# Patient Record
Sex: FEMALE | Race: WHITE | Employment: OTHER | URBAN - METROPOLITAN AREA
[De-identification: names, ages, dates, MRNs, and addresses within clinical notes are randomized per-mention and may not be internally consistent; named-entity substitution may affect disease eponyms.]

---

## 2018-06-27 ENCOUNTER — OP HISTORICAL/CONVERTED ENCOUNTER (OUTPATIENT)
Dept: OTHER | Age: 68
End: 2018-06-27

## 2019-03-11 ENCOUNTER — ED HISTORICAL/CONVERTED ENCOUNTER (OUTPATIENT)
Dept: OTHER | Age: 69
End: 2019-03-11

## 2022-06-27 ENCOUNTER — HOSPITAL ENCOUNTER (OUTPATIENT)
Dept: GENERAL RADIOLOGY | Age: 72
Discharge: HOME | End: 2022-06-27
Payer: MEDICARE

## 2022-06-27 ENCOUNTER — TRANSCRIBE ORDER (OUTPATIENT)
Dept: REGISTRATION | Age: 72
End: 2022-06-27

## 2022-06-27 DIAGNOSIS — R06.02 SOB (SHORTNESS OF BREATH): ICD-10-CM

## 2022-06-27 DIAGNOSIS — R06.02 SOB (SHORTNESS OF BREATH): Primary | ICD-10-CM

## 2022-06-27 PROCEDURE — 71046 X-RAY EXAM CHEST 2 VIEWS: CPT

## 2024-06-26 ENCOUNTER — HOSPITAL ENCOUNTER (EMERGENCY)
Facility: HOSPITAL | Age: 74
Discharge: HOME | End: 2024-06-26
Payer: MEDICARE

## 2024-06-26 VITALS
BODY MASS INDEX: 38.98 KG/M2 | WEIGHT: 220 LBS | HEIGHT: 63 IN | SYSTOLIC BLOOD PRESSURE: 116 MMHG | OXYGEN SATURATION: 99 % | HEART RATE: 81 BPM | DIASTOLIC BLOOD PRESSURE: 84 MMHG | RESPIRATION RATE: 16 BRPM | TEMPERATURE: 99 F

## 2024-06-26 DIAGNOSIS — N30.01 ACUTE CYSTITIS WITH HEMATURIA: Primary | ICD-10-CM

## 2024-06-26 LAB
APPEARANCE UR: ABNORMAL
BACTERIA URNS QL MICRO: ABNORMAL /HPF
BILIRUB UR QL: NEGATIVE
COLOR UR: ABNORMAL
EPITH CASTS URNS QL MICRO: ABNORMAL /LPF
GLUCOSE UR STRIP.AUTO-MCNC: NEGATIVE MG/DL
HGB UR QL STRIP: NEGATIVE
KETONES UR QL STRIP.AUTO: NEGATIVE MG/DL
LEUKOCYTE ESTERASE UR QL STRIP.AUTO: ABNORMAL
MUCOUS THREADS URNS QL MICRO: ABNORMAL /LPF
NITRITE UR QL STRIP.AUTO: NEGATIVE
PH UR STRIP: 5 (ref 5–8)
PROT UR STRIP-MCNC: 100 MG/DL
RBC #/AREA URNS HPF: ABNORMAL /HPF (ref 0–5)
SP GR UR REFRACTOMETRY: 1.03 (ref 1–1.03)
URINE CULTURE IF INDICATED: ABNORMAL
UROBILINOGEN UR QL STRIP.AUTO: 4 EU/DL (ref 0.1–1)
WBC URNS QL MICRO: >100 /HPF (ref 0–4)

## 2024-06-26 PROCEDURE — 87086 URINE CULTURE/COLONY COUNT: CPT

## 2024-06-26 PROCEDURE — 81001 URINALYSIS AUTO W/SCOPE: CPT

## 2024-06-26 PROCEDURE — 99283 EMERGENCY DEPT VISIT LOW MDM: CPT

## 2024-06-26 RX ORDER — CEFDINIR 300 MG/1
300 CAPSULE ORAL 2 TIMES DAILY
Qty: 14 CAPSULE | Refills: 0 | Status: SHIPPED | OUTPATIENT
Start: 2024-06-26 | End: 2024-07-03

## 2024-06-26 NOTE — ED PROVIDER NOTES
[facility] EMERGENCY DEPT  EMERGENCY DEPARTMENT HISTORY AND PHYSICAL EXAM      Date: 6/26/2024  Patient Name: Danielle Kitchen  MRN: [id number]  YOB: 1950  Date of evaluation: 6/26/2024  Provider: ESTEFANY Robles NP   Note Started: 2:51 PM EDT 6/26/24    HISTORY OF PRESENT ILLNESS     Chief Complaint   Patient presents with    Chills       History Provided By: Patient    HPI: Danielle Kitchen is a 73 y.o. female with past medical history asBelow presents to the ER for chills.  Patient has had chills for 2 days.  Patient also complains of urinary frequency.  Patient comes in for evaluation.    PAST MEDICAL HISTORY   Past Medical History:  Past Medical History:   Diagnosis Date    Asthma     Hypertension        Past Surgical History:  History reviewed. No pertinent surgical history.    Family History:  History reviewed. No pertinent family history.    Social History:       Allergies:  No Known Allergies    PCP: Luke Owens MD    Current Meds:   No current facility-administered medications for this encounter.     Current Outpatient Medications   Medication Sig Dispense Refill    cefdinir (OMNICEF) 300 MG capsule Take 1 capsule by mouth 2 times daily for 7 days 14 capsule 0       Social Determinants of Health:   Social Determinants of Health     Tobacco Use: Not on file   Alcohol Use: Not on file   Financial Resource Strain: Not on file   Food Insecurity: Not on file   Transportation Needs: Not on file   Physical Activity: Not on file   Stress: Not on file   Social Connections: Not on file   Intimate Partner Violence: Not on file   Depression: Not on file   Housing Stability: Not on file   Interpersonal Safety: Not At Risk (6/26/2024)    Interpersonal Safety Domain Source: IP Abuse Screening     Physical abuse: Denies     Verbal abuse: Denies     Emotional abuse: Denies     Financial abuse: Denies     Sexual abuse: Denies   Utilities: Not on file       PHYSICAL EXAM   Physical Exam  Constitutional:

## 2024-06-26 NOTE — ED TRIAGE NOTES
Pt complains of chills that have been on and off for 2 days. States she hasn't taken her temperature at home. No fever upon arrival. Denies any physical complaints.

## 2024-06-27 LAB
BACTERIA SPEC CULT: NORMAL
COLONY COUNT, CNT: NORMAL
Lab: NORMAL

## 2024-08-16 ENCOUNTER — HOSPITAL ENCOUNTER (EMERGENCY)
Facility: HOSPITAL | Age: 74
Discharge: ANOTHER HOSPITAL | End: 2024-08-16
Attending: EMERGENCY MEDICINE
Payer: MEDICARE

## 2024-08-16 ENCOUNTER — APPOINTMENT (OUTPATIENT)
Facility: HOSPITAL | Age: 74
End: 2024-08-16
Payer: MEDICARE

## 2024-08-16 VITALS
OXYGEN SATURATION: 98 % | HEIGHT: 63 IN | WEIGHT: 225 LBS | DIASTOLIC BLOOD PRESSURE: 89 MMHG | HEART RATE: 80 BPM | RESPIRATION RATE: 18 BRPM | SYSTOLIC BLOOD PRESSURE: 127 MMHG | TEMPERATURE: 98.5 F | BODY MASS INDEX: 39.87 KG/M2

## 2024-08-16 DIAGNOSIS — N39.0 URINARY TRACT INFECTION WITHOUT HEMATURIA, SITE UNSPECIFIED: ICD-10-CM

## 2024-08-16 DIAGNOSIS — A41.9 SEPTICEMIA (HCC): ICD-10-CM

## 2024-08-16 DIAGNOSIS — N20.0 KIDNEY STONE: Primary | ICD-10-CM

## 2024-08-16 LAB
ALBUMIN SERPL-MCNC: 2.9 G/DL (ref 3.5–5)
ALBUMIN/GLOB SERPL: 0.9 (ref 1.1–2.2)
ALP SERPL-CCNC: 72 U/L (ref 45–117)
ALT SERPL-CCNC: 9 U/L (ref 12–78)
ANION GAP SERPL CALC-SCNC: 5 MMOL/L (ref 5–15)
APPEARANCE UR: CLEAR
AST SERPL W P-5'-P-CCNC: 7 U/L (ref 15–37)
BACTERIA URNS QL MICRO: NEGATIVE /HPF
BASOPHILS # BLD: 0 K/UL (ref 0–0.1)
BASOPHILS NFR BLD: 0 % (ref 0–1)
BILIRUB SERPL-MCNC: 0.5 MG/DL (ref 0.2–1)
BILIRUB UR QL: NEGATIVE
BUN SERPL-MCNC: 20 MG/DL (ref 6–20)
BUN/CREAT SERPL: 17 (ref 12–20)
CA-I BLD-MCNC: 8.7 MG/DL (ref 8.5–10.1)
CHLORIDE SERPL-SCNC: 102 MMOL/L (ref 97–108)
CO2 SERPL-SCNC: 29 MMOL/L (ref 21–32)
COLOR UR: ABNORMAL
CREAT SERPL-MCNC: 1.15 MG/DL (ref 0.55–1.02)
DIFFERENTIAL METHOD BLD: ABNORMAL
EOSINOPHIL # BLD: 0.1 K/UL (ref 0–0.4)
EOSINOPHIL NFR BLD: 1 % (ref 0–7)
EPITH CASTS URNS QL MICRO: ABNORMAL /LPF
ERYTHROCYTE [DISTWIDTH] IN BLOOD BY AUTOMATED COUNT: 15.7 % (ref 11.5–14.5)
GLOBULIN SER CALC-MCNC: 3.3 G/DL (ref 2–4)
GLUCOSE BLD STRIP.AUTO-MCNC: 117 MG/DL (ref 65–100)
GLUCOSE SERPL-MCNC: 122 MG/DL (ref 65–100)
GLUCOSE UR STRIP.AUTO-MCNC: NEGATIVE MG/DL
HCT VFR BLD AUTO: 30.8 % (ref 35–47)
HGB BLD-MCNC: 9.7 G/DL (ref 11.5–16)
HGB UR QL STRIP: NEGATIVE
IMM GRANULOCYTES # BLD AUTO: 0.1 K/UL (ref 0–0.04)
IMM GRANULOCYTES NFR BLD AUTO: 1 % (ref 0–0.5)
KETONES UR QL STRIP.AUTO: NEGATIVE MG/DL
LACTATE BLD-SCNC: 1.5 MMOL/L (ref 0.4–2)
LEUKOCYTE ESTERASE UR QL STRIP.AUTO: ABNORMAL
LYMPHOCYTES # BLD: 0.6 K/UL (ref 0.8–3.5)
LYMPHOCYTES NFR BLD: 5 % (ref 12–49)
MCH RBC QN AUTO: 25.5 PG (ref 26–34)
MCHC RBC AUTO-ENTMCNC: 31.5 G/DL (ref 30–36.5)
MCV RBC AUTO: 80.8 FL (ref 80–99)
MONOCYTES # BLD: 1.3 K/UL (ref 0–1)
MONOCYTES NFR BLD: 11 % (ref 5–13)
NEUTS SEG # BLD: 9.4 K/UL (ref 1.8–8)
NEUTS SEG NFR BLD: 82 % (ref 32–75)
NITRITE UR QL STRIP.AUTO: NEGATIVE
NRBC # BLD: 0 K/UL (ref 0–0.01)
NRBC BLD-RTO: 0 PER 100 WBC
PERFORMED BY:: ABNORMAL
PH UR STRIP: 5 (ref 5–8)
PLATELET # BLD AUTO: 187 K/UL (ref 150–400)
PMV BLD AUTO: 9.9 FL (ref 8.9–12.9)
POTASSIUM SERPL-SCNC: 3.6 MMOL/L (ref 3.5–5.1)
PROCALCITONIN SERPL-MCNC: 0.1 NG/ML
PROT SERPL-MCNC: 6.2 G/DL (ref 6.4–8.2)
PROT UR STRIP-MCNC: NEGATIVE MG/DL
RBC # BLD AUTO: 3.81 M/UL (ref 3.8–5.2)
RBC #/AREA URNS HPF: ABNORMAL /HPF (ref 0–5)
SODIUM SERPL-SCNC: 136 MMOL/L (ref 136–145)
SP GR UR REFRACTOMETRY: >1.03 (ref 1–1.03)
SPECIMEN SITE: ABNORMAL
TROPONIN I SERPL HS-MCNC: 5 NG/L (ref 0–51)
URINE CULTURE IF INDICATED: ABNORMAL
UROBILINOGEN UR QL STRIP.AUTO: 2 EU/DL (ref 0.1–1)
WBC # BLD AUTO: 11.4 K/UL (ref 3.6–11)
WBC URNS QL MICRO: ABNORMAL /HPF (ref 0–4)

## 2024-08-16 PROCEDURE — 84484 ASSAY OF TROPONIN QUANT: CPT

## 2024-08-16 PROCEDURE — 6360000002 HC RX W HCPCS: Performed by: EMERGENCY MEDICINE

## 2024-08-16 PROCEDURE — 84145 PROCALCITONIN (PCT): CPT

## 2024-08-16 PROCEDURE — 99285 EMERGENCY DEPT VISIT HI MDM: CPT

## 2024-08-16 PROCEDURE — 83605 ASSAY OF LACTIC ACID: CPT

## 2024-08-16 PROCEDURE — 36415 COLL VENOUS BLD VENIPUNCTURE: CPT

## 2024-08-16 PROCEDURE — 85025 COMPLETE CBC W/AUTO DIFF WBC: CPT

## 2024-08-16 PROCEDURE — 81001 URINALYSIS AUTO W/SCOPE: CPT

## 2024-08-16 PROCEDURE — 80053 COMPREHEN METABOLIC PANEL: CPT

## 2024-08-16 PROCEDURE — 2580000003 HC RX 258: Performed by: EMERGENCY MEDICINE

## 2024-08-16 PROCEDURE — 6360000004 HC RX CONTRAST MEDICATION: Performed by: EMERGENCY MEDICINE

## 2024-08-16 PROCEDURE — 80047 BASIC METABLC PNL IONIZED CA: CPT

## 2024-08-16 PROCEDURE — 87040 BLOOD CULTURE FOR BACTERIA: CPT

## 2024-08-16 PROCEDURE — 87086 URINE CULTURE/COLONY COUNT: CPT

## 2024-08-16 PROCEDURE — 96375 TX/PRO/DX INJ NEW DRUG ADDON: CPT

## 2024-08-16 PROCEDURE — 74177 CT ABD & PELVIS W/CONTRAST: CPT

## 2024-08-16 PROCEDURE — 96374 THER/PROPH/DIAG INJ IV PUSH: CPT

## 2024-08-16 PROCEDURE — 6370000000 HC RX 637 (ALT 250 FOR IP): Performed by: EMERGENCY MEDICINE

## 2024-08-16 PROCEDURE — 71045 X-RAY EXAM CHEST 1 VIEW: CPT

## 2024-08-16 PROCEDURE — 93005 ELECTROCARDIOGRAM TRACING: CPT | Performed by: EMERGENCY MEDICINE

## 2024-08-16 RX ORDER — LISINOPRIL AND HYDROCHLOROTHIAZIDE 12.5; 1 MG/1; MG/1
1 TABLET ORAL DAILY
COMMUNITY
Start: 2023-08-20

## 2024-08-16 RX ORDER — MORPHINE SULFATE 2 MG/ML
2 INJECTION, SOLUTION INTRAMUSCULAR; INTRAVENOUS
Status: COMPLETED | OUTPATIENT
Start: 2024-08-16 | End: 2024-08-16

## 2024-08-16 RX ORDER — ACETAMINOPHEN 500 MG
1000 TABLET ORAL
Status: COMPLETED | OUTPATIENT
Start: 2024-08-16 | End: 2024-08-16

## 2024-08-16 RX ORDER — VIT A/VIT C/VIT E/ZINC/COPPER 4296-226
1 CAPSULE ORAL DAILY
COMMUNITY

## 2024-08-16 RX ORDER — 0.9 % SODIUM CHLORIDE 0.9 %
30 INTRAVENOUS SOLUTION INTRAVENOUS ONCE
Status: DISCONTINUED | OUTPATIENT
Start: 2024-08-16 | End: 2024-08-16

## 2024-08-16 RX ORDER — BUDESONIDE AND FORMOTEROL FUMARATE DIHYDRATE 160; 4.5 UG/1; UG/1
2 AEROSOL RESPIRATORY (INHALATION) 2 TIMES DAILY
COMMUNITY
Start: 2014-09-09

## 2024-08-16 RX ORDER — METFORMIN HYDROCHLORIDE 500 MG/1
500 TABLET, EXTENDED RELEASE ORAL
COMMUNITY
Start: 2022-04-11

## 2024-08-16 RX ORDER — FLUTICASONE PROPIONATE AND SALMETEROL 250; 50 UG/1; UG/1
1 POWDER RESPIRATORY (INHALATION) 2 TIMES DAILY
COMMUNITY
Start: 2014-09-09

## 2024-08-16 RX ORDER — 0.9 % SODIUM CHLORIDE 0.9 %
500 INTRAVENOUS SOLUTION INTRAVENOUS ONCE
Status: COMPLETED | OUTPATIENT
Start: 2024-08-16 | End: 2024-08-16

## 2024-08-16 RX ORDER — ASPIRIN 81 MG/1
81 TABLET ORAL
COMMUNITY
Start: 2018-04-19

## 2024-08-16 RX ORDER — ALBUTEROL SULFATE 90 UG/1
2 AEROSOL, METERED RESPIRATORY (INHALATION) EVERY 4 HOURS PRN
COMMUNITY
Start: 2014-09-09

## 2024-08-16 RX ADMIN — CEFTRIAXONE SODIUM 1000 MG: 1 INJECTION, POWDER, FOR SOLUTION INTRAMUSCULAR; INTRAVENOUS at 16:08

## 2024-08-16 RX ADMIN — MORPHINE SULFATE 2 MG: 2 INJECTION, SOLUTION INTRAMUSCULAR; INTRAVENOUS at 16:08

## 2024-08-16 RX ADMIN — ACETAMINOPHEN 1000 MG: 500 TABLET ORAL at 16:08

## 2024-08-16 RX ADMIN — SODIUM CHLORIDE 500 ML: 9 INJECTION, SOLUTION INTRAVENOUS at 16:11

## 2024-08-16 RX ADMIN — IOPAMIDOL 100 ML: 755 INJECTION, SOLUTION INTRAVENOUS at 17:23

## 2024-08-16 ASSESSMENT — LIFESTYLE VARIABLES
HOW MANY STANDARD DRINKS CONTAINING ALCOHOL DO YOU HAVE ON A TYPICAL DAY: PATIENT DOES NOT DRINK
HOW OFTEN DO YOU HAVE A DRINK CONTAINING ALCOHOL: NEVER

## 2024-08-16 ASSESSMENT — PAIN SCALES - GENERAL
PAINLEVEL_OUTOF10: 1
PAINLEVEL_OUTOF10: 3

## 2024-08-16 ASSESSMENT — PAIN DESCRIPTION - DESCRIPTORS: DESCRIPTORS: ACHING

## 2024-08-16 ASSESSMENT — PAIN - FUNCTIONAL ASSESSMENT: PAIN_FUNCTIONAL_ASSESSMENT: ACTIVITIES ARE NOT PREVENTED

## 2024-08-16 ASSESSMENT — PAIN DESCRIPTION - LOCATION: LOCATION: OTHER (COMMENT)

## 2024-08-16 NOTE — ED PROVIDER NOTES
[facility] EMERGENCY DEPT  EMERGENCY DEPARTMENT HISTORY AND PHYSICAL EXAM      Date: 8/16/2024  Patient Name: Danielle Kitchen  MRN: [id number]  Birthdate 1950  Date of evaluation: 8/16/2024  Provider: John David DO   Note Started: 3:30 PM EDT 8/16/24    HISTORY OF PRESENT ILLNESS     Chief Complaint   Patient presents with    Urinary Frequency    Flank Pain     Left     History Provided By: Patient    HPI: Danielle Kitchen is a 73 y.o. female with past medical history of asthma and hypertension  who presents with fever and left flank pain.  Symptoms have been present for a couple of days.  She did have some nausea but is resolved.  No vomiting.    PAST MEDICAL HISTORY   Past Medical History:  Past Medical History:   Diagnosis Date    Asthma     Hypertension        Past Surgical History:  History reviewed. No pertinent surgical history.    Family History:  History reviewed. No pertinent family history.    Social History:  Social History     Tobacco Use    Smoking status: Former     Types: Cigarettes     Passive exposure: Never    Smokeless tobacco: Never   Substance Use Topics    Alcohol use: Never    Drug use: Never       Allergies:  No Known Allergies    PCP: Luke Owens MD    Current Meds:   No current facility-administered medications for this encounter.     Current Outpatient Medications   Medication Sig Dispense Refill    metFORMIN (GLUCOPHAGE-XR) 500 MG extended release tablet Take 1 tablet by mouth daily (with breakfast)      lisinopril-hydroCHLOROthiazide (PRINZIDE;ZESTORETIC) 10-12.5 MG per tablet Take 1 tablet by mouth daily      fluticasone-salmeterol (ADVAIR DISKUS) 250-50 MCG/ACT AEPB diskus inhaler Inhale 1 puff into the lungs 2 times daily      budesonide-formoterol (SYMBICORT) 160-4.5 MCG/ACT AERO Inhale 2 puffs into the lungs 2 times daily      aspirin 81 MG EC tablet Take 1 tablet by mouth 3 time a week      albuterol sulfate HFA (VENTOLIN HFA) 108 (90 Base) MCG/ACT inhaler Inhale 2 puffs into

## 2024-08-17 ENCOUNTER — HOSPITAL ENCOUNTER (INPATIENT)
Facility: HOSPITAL | Age: 74
LOS: 1 days | Discharge: HOME | DRG: 854 | End: 2024-08-18
Attending: STUDENT IN AN ORGANIZED HEALTH CARE EDUCATION/TRAINING PROGRAM | Admitting: STUDENT IN AN ORGANIZED HEALTH CARE EDUCATION/TRAINING PROGRAM
Payer: MEDICARE

## 2024-08-17 ENCOUNTER — APPOINTMENT (OUTPATIENT)
Facility: HOSPITAL | Age: 74
DRG: 854 | End: 2024-08-17
Attending: STUDENT IN AN ORGANIZED HEALTH CARE EDUCATION/TRAINING PROGRAM
Payer: MEDICARE

## 2024-08-17 ENCOUNTER — ANESTHESIA (OUTPATIENT)
Facility: HOSPITAL | Age: 74
End: 2024-08-17
Payer: MEDICARE

## 2024-08-17 ENCOUNTER — ANESTHESIA EVENT (OUTPATIENT)
Facility: HOSPITAL | Age: 74
End: 2024-08-17
Payer: MEDICARE

## 2024-08-17 PROBLEM — N11.1 OBSTRUCTIVE PYELONEPHRITIS: Status: ACTIVE | Noted: 2024-08-17

## 2024-08-17 LAB
ANION GAP SERPL CALC-SCNC: 7 MMOL/L (ref 5–15)
BASOPHILS # BLD: 0 K/UL (ref 0–0.1)
BASOPHILS NFR BLD: 0 % (ref 0–1)
BUN SERPL-MCNC: 16 MG/DL (ref 6–20)
BUN/CREAT SERPL: 14 (ref 12–20)
CALCIUM SERPL-MCNC: 8.5 MG/DL (ref 8.5–10.1)
CHLORIDE SERPL-SCNC: 105 MMOL/L (ref 97–108)
CO2 SERPL-SCNC: 25 MMOL/L (ref 21–32)
CREAT SERPL-MCNC: 1.11 MG/DL (ref 0.55–1.02)
DIFFERENTIAL METHOD BLD: ABNORMAL
EKG ATRIAL RATE: 82 BPM
EKG DIAGNOSIS: NORMAL
EKG P AXIS: 59 DEGREES
EKG P-R INTERVAL: 176 MS
EKG Q-T INTERVAL: 340 MS
EKG QRS DURATION: 102 MS
EKG QTC CALCULATION (BAZETT): 397 MS
EKG R AXIS: -20 DEGREES
EKG T AXIS: 42 DEGREES
EKG VENTRICULAR RATE: 82 BPM
EOSINOPHIL # BLD: 0.1 K/UL (ref 0–0.4)
EOSINOPHIL NFR BLD: 1 % (ref 0–7)
ERYTHROCYTE [DISTWIDTH] IN BLOOD BY AUTOMATED COUNT: 15.9 % (ref 11.5–14.5)
GLUCOSE BLD STRIP.AUTO-MCNC: 120 MG/DL (ref 65–117)
GLUCOSE SERPL-MCNC: 124 MG/DL (ref 65–100)
HCT VFR BLD AUTO: 29.7 % (ref 35–47)
HGB BLD-MCNC: 9.2 G/DL (ref 11.5–16)
IMM GRANULOCYTES # BLD AUTO: 0.1 K/UL (ref 0–0.04)
IMM GRANULOCYTES NFR BLD AUTO: 1 % (ref 0–0.5)
LYMPHOCYTES # BLD: 0.7 K/UL (ref 0.8–3.5)
LYMPHOCYTES NFR BLD: 7 % (ref 12–49)
MAGNESIUM SERPL-MCNC: 1.9 MG/DL (ref 1.6–2.4)
MCH RBC QN AUTO: 25.1 PG (ref 26–34)
MCHC RBC AUTO-ENTMCNC: 31 G/DL (ref 30–36.5)
MCV RBC AUTO: 80.9 FL (ref 80–99)
MONOCYTES # BLD: 1.3 K/UL (ref 0–1)
MONOCYTES NFR BLD: 12 % (ref 5–13)
NEUTS SEG # BLD: 8.4 K/UL (ref 1.8–8)
NEUTS SEG NFR BLD: 79 % (ref 32–75)
NRBC # BLD: 0 K/UL (ref 0–0.01)
NRBC BLD-RTO: 0 PER 100 WBC
PLATELET # BLD AUTO: 162 K/UL (ref 150–400)
PMV BLD AUTO: 10 FL (ref 8.9–12.9)
POTASSIUM SERPL-SCNC: 3.8 MMOL/L (ref 3.5–5.1)
RBC # BLD AUTO: 3.67 M/UL (ref 3.8–5.2)
RBC MORPH BLD: ABNORMAL
SERVICE CMNT-IMP: ABNORMAL
SODIUM SERPL-SCNC: 137 MMOL/L (ref 136–145)
WBC # BLD AUTO: 10.6 K/UL (ref 3.6–11)

## 2024-08-17 PROCEDURE — BT1F1ZZ FLUOROSCOPY OF LEFT KIDNEY, URETER AND BLADDER USING LOW OSMOLAR CONTRAST: ICD-10-PCS | Performed by: UROLOGY

## 2024-08-17 PROCEDURE — 2580000003 HC RX 258: Performed by: STUDENT IN AN ORGANIZED HEALTH CARE EDUCATION/TRAINING PROGRAM

## 2024-08-17 PROCEDURE — 1100000003 HC PRIVATE W/ TELEMETRY

## 2024-08-17 PROCEDURE — 97535 SELF CARE MNGMENT TRAINING: CPT | Performed by: OCCUPATIONAL THERAPIST

## 2024-08-17 PROCEDURE — 7100000001 HC PACU RECOVERY - ADDTL 15 MIN: Performed by: UROLOGY

## 2024-08-17 PROCEDURE — 82962 GLUCOSE BLOOD TEST: CPT

## 2024-08-17 PROCEDURE — 87186 SC STD MICRODIL/AGAR DIL: CPT

## 2024-08-17 PROCEDURE — 6360000004 HC RX CONTRAST MEDICATION: Performed by: UROLOGY

## 2024-08-17 PROCEDURE — 6360000002 HC RX W HCPCS: Performed by: NURSE ANESTHETIST, CERTIFIED REGISTERED

## 2024-08-17 PROCEDURE — 80048 BASIC METABOLIC PNL TOTAL CA: CPT

## 2024-08-17 PROCEDURE — 0T778DZ DILATION OF LEFT URETER WITH INTRALUMINAL DEVICE, VIA NATURAL OR ARTIFICIAL OPENING ENDOSCOPIC: ICD-10-PCS | Performed by: UROLOGY

## 2024-08-17 PROCEDURE — 85025 COMPLETE CBC W/AUTO DIFF WBC: CPT

## 2024-08-17 PROCEDURE — 6370000000 HC RX 637 (ALT 250 FOR IP): Performed by: STUDENT IN AN ORGANIZED HEALTH CARE EDUCATION/TRAINING PROGRAM

## 2024-08-17 PROCEDURE — 2700000000 HC OXYGEN THERAPY PER DAY

## 2024-08-17 PROCEDURE — 7100000000 HC PACU RECOVERY - FIRST 15 MIN: Performed by: UROLOGY

## 2024-08-17 PROCEDURE — 97166 OT EVAL MOD COMPLEX 45 MIN: CPT | Performed by: OCCUPATIONAL THERAPIST

## 2024-08-17 PROCEDURE — 3600000013 HC SURGERY LEVEL 3 ADDTL 15MIN: Performed by: UROLOGY

## 2024-08-17 PROCEDURE — 3700000000 HC ANESTHESIA ATTENDED CARE: Performed by: UROLOGY

## 2024-08-17 PROCEDURE — 6360000002 HC RX W HCPCS: Performed by: UROLOGY

## 2024-08-17 PROCEDURE — C1758 CATHETER, URETERAL: HCPCS | Performed by: UROLOGY

## 2024-08-17 PROCEDURE — 6360000002 HC RX W HCPCS: Performed by: STUDENT IN AN ORGANIZED HEALTH CARE EDUCATION/TRAINING PROGRAM

## 2024-08-17 PROCEDURE — C1769 GUIDE WIRE: HCPCS | Performed by: UROLOGY

## 2024-08-17 PROCEDURE — 87086 URINE CULTURE/COLONY COUNT: CPT

## 2024-08-17 PROCEDURE — 36415 COLL VENOUS BLD VENIPUNCTURE: CPT

## 2024-08-17 PROCEDURE — 5A09357 ASSISTANCE WITH RESPIRATORY VENTILATION, LESS THAN 24 CONSECUTIVE HOURS, CONTINUOUS POSITIVE AIRWAY PRESSURE: ICD-10-PCS | Performed by: STUDENT IN AN ORGANIZED HEALTH CARE EDUCATION/TRAINING PROGRAM

## 2024-08-17 PROCEDURE — C2617 STENT, NON-COR, TEM W/O DEL: HCPCS | Performed by: UROLOGY

## 2024-08-17 PROCEDURE — 3600000003 HC SURGERY LEVEL 3 BASE: Performed by: UROLOGY

## 2024-08-17 PROCEDURE — 87088 URINE BACTERIA CULTURE: CPT

## 2024-08-17 PROCEDURE — 2709999900 HC NON-CHARGEABLE SUPPLY: Performed by: UROLOGY

## 2024-08-17 PROCEDURE — 2580000003 HC RX 258: Performed by: NURSE ANESTHETIST, CERTIFIED REGISTERED

## 2024-08-17 PROCEDURE — 94640 AIRWAY INHALATION TREATMENT: CPT

## 2024-08-17 PROCEDURE — 83735 ASSAY OF MAGNESIUM: CPT

## 2024-08-17 PROCEDURE — 2580000003 HC RX 258: Performed by: UROLOGY

## 2024-08-17 PROCEDURE — 3700000001 HC ADD 15 MINUTES (ANESTHESIA): Performed by: UROLOGY

## 2024-08-17 DEVICE — URETERAL STENT
Type: IMPLANTABLE DEVICE | Site: URETER | Status: FUNCTIONAL
Brand: POLARIS™ ULTRA

## 2024-08-17 RX ORDER — PHENAZOPYRIDINE HYDROCHLORIDE 100 MG/1
100 TABLET, FILM COATED ORAL 3 TIMES DAILY PRN
Status: DISCONTINUED | OUTPATIENT
Start: 2024-08-17 | End: 2024-08-18 | Stop reason: HOSPADM

## 2024-08-17 RX ORDER — SODIUM CHLORIDE 0.9 % (FLUSH) 0.9 %
5-40 SYRINGE (ML) INJECTION EVERY 12 HOURS SCHEDULED
Status: DISCONTINUED | OUTPATIENT
Start: 2024-08-17 | End: 2024-08-17 | Stop reason: HOSPADM

## 2024-08-17 RX ORDER — SODIUM CHLORIDE 0.9 % (FLUSH) 0.9 %
5-40 SYRINGE (ML) INJECTION PRN
Status: DISCONTINUED | OUTPATIENT
Start: 2024-08-17 | End: 2024-08-17 | Stop reason: HOSPADM

## 2024-08-17 RX ORDER — ONDANSETRON 2 MG/ML
4 INJECTION INTRAMUSCULAR; INTRAVENOUS EVERY 6 HOURS PRN
Status: DISCONTINUED | OUTPATIENT
Start: 2024-08-17 | End: 2024-08-18 | Stop reason: HOSPADM

## 2024-08-17 RX ORDER — TAMSULOSIN HYDROCHLORIDE 0.4 MG/1
0.4 CAPSULE ORAL DAILY
Status: DISCONTINUED | OUTPATIENT
Start: 2024-08-17 | End: 2024-08-18 | Stop reason: HOSPADM

## 2024-08-17 RX ORDER — IPRATROPIUM BROMIDE AND ALBUTEROL SULFATE 2.5; .5 MG/3ML; MG/3ML
1 SOLUTION RESPIRATORY (INHALATION) EVERY 4 HOURS PRN
Status: DISCONTINUED | OUTPATIENT
Start: 2024-08-17 | End: 2024-08-18 | Stop reason: HOSPADM

## 2024-08-17 RX ORDER — DEXTROSE MONOHYDRATE 100 MG/ML
INJECTION, SOLUTION INTRAVENOUS CONTINUOUS PRN
Status: DISCONTINUED | OUTPATIENT
Start: 2024-08-17 | End: 2024-08-17 | Stop reason: HOSPADM

## 2024-08-17 RX ORDER — FENTANYL CITRATE 50 UG/ML
25 INJECTION, SOLUTION INTRAMUSCULAR; INTRAVENOUS EVERY 5 MIN PRN
Status: DISCONTINUED | OUTPATIENT
Start: 2024-08-17 | End: 2024-08-17 | Stop reason: HOSPADM

## 2024-08-17 RX ORDER — OXYCODONE HYDROCHLORIDE 5 MG/1
5 TABLET ORAL EVERY 4 HOURS PRN
Status: DISCONTINUED | OUTPATIENT
Start: 2024-08-17 | End: 2024-08-18 | Stop reason: HOSPADM

## 2024-08-17 RX ORDER — PHENYLEPHRINE HCL IN 0.9% NACL 0.4MG/10ML
SYRINGE (ML) INTRAVENOUS PRN
Status: DISCONTINUED | OUTPATIENT
Start: 2024-08-17 | End: 2024-08-17 | Stop reason: SDUPTHER

## 2024-08-17 RX ORDER — DEXAMETHASONE SODIUM PHOSPHATE 4 MG/ML
INJECTION, SOLUTION INTRA-ARTICULAR; INTRALESIONAL; INTRAMUSCULAR; INTRAVENOUS; SOFT TISSUE PRN
Status: DISCONTINUED | OUTPATIENT
Start: 2024-08-17 | End: 2024-08-17 | Stop reason: SDUPTHER

## 2024-08-17 RX ORDER — SODIUM CHLORIDE 0.9 % (FLUSH) 0.9 %
5-40 SYRINGE (ML) INJECTION PRN
Status: DISCONTINUED | OUTPATIENT
Start: 2024-08-17 | End: 2024-08-18 | Stop reason: HOSPADM

## 2024-08-17 RX ORDER — PROCHLORPERAZINE EDISYLATE 5 MG/ML
10 INJECTION INTRAMUSCULAR; INTRAVENOUS EVERY 6 HOURS PRN
Status: DISCONTINUED | OUTPATIENT
Start: 2024-08-17 | End: 2024-08-18 | Stop reason: HOSPADM

## 2024-08-17 RX ORDER — IPRATROPIUM BROMIDE AND ALBUTEROL SULFATE 2.5; .5 MG/3ML; MG/3ML
1 SOLUTION RESPIRATORY (INHALATION)
Status: DISCONTINUED | OUTPATIENT
Start: 2024-08-17 | End: 2024-08-17 | Stop reason: HOSPADM

## 2024-08-17 RX ORDER — SODIUM CHLORIDE 9 MG/ML
INJECTION, SOLUTION INTRAVENOUS PRN
Status: DISCONTINUED | OUTPATIENT
Start: 2024-08-17 | End: 2024-08-17 | Stop reason: HOSPADM

## 2024-08-17 RX ORDER — SODIUM CHLORIDE 9 MG/ML
INJECTION, SOLUTION INTRAVENOUS PRN
Status: DISCONTINUED | OUTPATIENT
Start: 2024-08-17 | End: 2024-08-18 | Stop reason: HOSPADM

## 2024-08-17 RX ORDER — NALOXONE HYDROCHLORIDE 0.4 MG/ML
INJECTION, SOLUTION INTRAMUSCULAR; INTRAVENOUS; SUBCUTANEOUS PRN
Status: DISCONTINUED | OUTPATIENT
Start: 2024-08-17 | End: 2024-08-17 | Stop reason: HOSPADM

## 2024-08-17 RX ORDER — ASPIRIN 81 MG/1
81 TABLET ORAL DAILY
Status: DISCONTINUED | OUTPATIENT
Start: 2024-08-17 | End: 2024-08-18 | Stop reason: HOSPADM

## 2024-08-17 RX ORDER — ONDANSETRON 4 MG/1
4 TABLET, ORALLY DISINTEGRATING ORAL EVERY 8 HOURS PRN
Status: DISCONTINUED | OUTPATIENT
Start: 2024-08-17 | End: 2024-08-18 | Stop reason: HOSPADM

## 2024-08-17 RX ORDER — KETOROLAC TROMETHAMINE 30 MG/ML
15 INJECTION, SOLUTION INTRAMUSCULAR; INTRAVENOUS EVERY 6 HOURS PRN
Status: DISCONTINUED | OUTPATIENT
Start: 2024-08-17 | End: 2024-08-18 | Stop reason: HOSPADM

## 2024-08-17 RX ORDER — ACETAMINOPHEN 650 MG/1
650 SUPPOSITORY RECTAL EVERY 6 HOURS PRN
Status: DISCONTINUED | OUTPATIENT
Start: 2024-08-17 | End: 2024-08-18 | Stop reason: HOSPADM

## 2024-08-17 RX ORDER — SODIUM CHLORIDE, SODIUM LACTATE, POTASSIUM CHLORIDE, CALCIUM CHLORIDE 600; 310; 30; 20 MG/100ML; MG/100ML; MG/100ML; MG/100ML
INJECTION, SOLUTION INTRAVENOUS CONTINUOUS
Status: ACTIVE | OUTPATIENT
Start: 2024-08-17 | End: 2024-08-17

## 2024-08-17 RX ORDER — 0.9 % SODIUM CHLORIDE 0.9 %
1000 INTRAVENOUS SOLUTION INTRAVENOUS ONCE
Status: COMPLETED | OUTPATIENT
Start: 2024-08-17 | End: 2024-08-17

## 2024-08-17 RX ORDER — POLYETHYLENE GLYCOL 3350 17 G/17G
17 POWDER, FOR SOLUTION ORAL DAILY PRN
Status: DISCONTINUED | OUTPATIENT
Start: 2024-08-17 | End: 2024-08-18 | Stop reason: HOSPADM

## 2024-08-17 RX ORDER — FENTANYL CITRATE 50 UG/ML
INJECTION, SOLUTION INTRAMUSCULAR; INTRAVENOUS PRN
Status: DISCONTINUED | OUTPATIENT
Start: 2024-08-17 | End: 2024-08-17 | Stop reason: SDUPTHER

## 2024-08-17 RX ORDER — ONDANSETRON 2 MG/ML
4 INJECTION INTRAMUSCULAR; INTRAVENOUS
Status: DISCONTINUED | OUTPATIENT
Start: 2024-08-17 | End: 2024-08-17 | Stop reason: HOSPADM

## 2024-08-17 RX ORDER — PROCHLORPERAZINE EDISYLATE 5 MG/ML
5 INJECTION INTRAMUSCULAR; INTRAVENOUS
Status: DISCONTINUED | OUTPATIENT
Start: 2024-08-17 | End: 2024-08-17 | Stop reason: HOSPADM

## 2024-08-17 RX ORDER — ACETAMINOPHEN 325 MG/1
650 TABLET ORAL EVERY 6 HOURS PRN
Status: DISCONTINUED | OUTPATIENT
Start: 2024-08-17 | End: 2024-08-18 | Stop reason: HOSPADM

## 2024-08-17 RX ORDER — SODIUM CHLORIDE 0.9 % (FLUSH) 0.9 %
5-40 SYRINGE (ML) INJECTION EVERY 12 HOURS SCHEDULED
Status: DISCONTINUED | OUTPATIENT
Start: 2024-08-17 | End: 2024-08-18 | Stop reason: HOSPADM

## 2024-08-17 RX ORDER — ONDANSETRON 2 MG/ML
INJECTION INTRAMUSCULAR; INTRAVENOUS PRN
Status: DISCONTINUED | OUTPATIENT
Start: 2024-08-17 | End: 2024-08-17 | Stop reason: SDUPTHER

## 2024-08-17 RX ORDER — LANOLIN ALCOHOL/MO/W.PET/CERES
3 CREAM (GRAM) TOPICAL NIGHTLY PRN
Status: DISCONTINUED | OUTPATIENT
Start: 2024-08-17 | End: 2024-08-18 | Stop reason: HOSPADM

## 2024-08-17 RX ORDER — HYDROMORPHONE HYDROCHLORIDE 1 MG/ML
0.5 INJECTION, SOLUTION INTRAMUSCULAR; INTRAVENOUS; SUBCUTANEOUS EVERY 5 MIN PRN
Status: DISCONTINUED | OUTPATIENT
Start: 2024-08-17 | End: 2024-08-17 | Stop reason: HOSPADM

## 2024-08-17 RX ADMIN — Medication 60 MCG: at 14:40

## 2024-08-17 RX ADMIN — CEFTRIAXONE 1000 MG: 1 INJECTION, POWDER, FOR SOLUTION INTRAMUSCULAR; INTRAVENOUS at 01:40

## 2024-08-17 RX ADMIN — ARFORMOTEROL TARTRATE: 15 SOLUTION RESPIRATORY (INHALATION) at 20:19

## 2024-08-17 RX ADMIN — SODIUM CHLORIDE 1000 ML: 9 INJECTION, SOLUTION INTRAVENOUS at 02:00

## 2024-08-17 RX ADMIN — PROPOFOL 20 MG: 10 INJECTION, EMULSION INTRAVENOUS at 14:26

## 2024-08-17 RX ADMIN — FENTANYL CITRATE 25 MCG: 50 INJECTION, SOLUTION INTRAMUSCULAR; INTRAVENOUS at 14:13

## 2024-08-17 RX ADMIN — SODIUM CHLORIDE, PRESERVATIVE FREE 10 ML: 5 INJECTION INTRAVENOUS at 08:20

## 2024-08-17 RX ADMIN — PHENYLEPHRINE HYDROCHLORIDE 20 MCG/MIN: 10 INJECTION INTRAVENOUS at 14:17

## 2024-08-17 RX ADMIN — ACETAMINOPHEN 650 MG: 325 TABLET ORAL at 12:55

## 2024-08-17 RX ADMIN — ARFORMOTEROL TARTRATE: 15 SOLUTION RESPIRATORY (INHALATION) at 07:25

## 2024-08-17 RX ADMIN — ASPIRIN 81 MG: 81 TABLET, COATED ORAL at 08:19

## 2024-08-17 RX ADMIN — SODIUM CHLORIDE, POTASSIUM CHLORIDE, SODIUM LACTATE AND CALCIUM CHLORIDE: 600; 310; 30; 20 INJECTION, SOLUTION INTRAVENOUS at 01:39

## 2024-08-17 RX ADMIN — DEXAMETHASONE SODIUM PHOSPHATE 8 MG: 4 INJECTION, SOLUTION INTRAMUSCULAR; INTRAVENOUS at 14:25

## 2024-08-17 RX ADMIN — FENTANYL CITRATE 25 MCG: 50 INJECTION, SOLUTION INTRAMUSCULAR; INTRAVENOUS at 14:30

## 2024-08-17 RX ADMIN — Medication 80 MCG: at 14:43

## 2024-08-17 RX ADMIN — PROPOFOL 20 MG: 10 INJECTION, EMULSION INTRAVENOUS at 14:17

## 2024-08-17 RX ADMIN — SODIUM CHLORIDE, PRESERVATIVE FREE 10 ML: 5 INJECTION INTRAVENOUS at 20:00

## 2024-08-17 RX ADMIN — FENTANYL CITRATE 25 MCG: 50 INJECTION, SOLUTION INTRAMUSCULAR; INTRAVENOUS at 14:25

## 2024-08-17 RX ADMIN — TAMSULOSIN HYDROCHLORIDE 0.4 MG: 0.4 CAPSULE ORAL at 02:23

## 2024-08-17 RX ADMIN — ACETAMINOPHEN 650 MG: 325 TABLET ORAL at 01:47

## 2024-08-17 RX ADMIN — PROPOFOL 50 MCG/KG/MIN: 10 INJECTION, EMULSION INTRAVENOUS at 14:18

## 2024-08-17 RX ADMIN — ONDANSETRON HYDROCHLORIDE 4 MG: 2 INJECTION, SOLUTION INTRAMUSCULAR; INTRAVENOUS at 14:25

## 2024-08-17 RX ADMIN — FENTANYL CITRATE 25 MCG: 50 INJECTION, SOLUTION INTRAMUSCULAR; INTRAVENOUS at 14:17

## 2024-08-17 ASSESSMENT — PAIN SCALES - GENERAL: PAINLEVEL_OUTOF10: 0

## 2024-08-17 NOTE — ANESTHESIA POSTPROCEDURE EVALUATION
Department of Anesthesiology  Postprocedure Note    Patient: Danielle Kitchen  MRN: [id number]  YOB: 1950  Date of evaluation: 8/17/2024    Procedure Summary       Date: 08/17/24 Room / Location: [facility] MAIN OR  / [facility] MAIN OR    Anesthesia Start: 1413 Anesthesia Stop: 1517    Procedure: CYSTOSCOPY LEFT  URETERAL STENT INSERTION (Left: Ureter) Diagnosis:       Ureteral calculi      (Ureteral calculi [N20.1])    Providers: Meño Person MD Responsible Provider: Rolando Cerda MD    Anesthesia Type: MAC ASA Status: 3            Anesthesia Type: MAC    Manuela Phase I: Manuela Score: 9    Manuela Phase II:      Anesthesia Post Evaluation    Patient location during evaluation: PACU  Patient participation: complete - patient participated  Level of consciousness: awake and alert  Airway patency: patent  Nausea & Vomiting: no nausea  Cardiovascular status: hemodynamically stable  Respiratory status: acceptable  Hydration status: euvolemic  There was medical reason for not using a multimodal analgesia pain management approach.Pain management: adequate    No notable events documented.

## 2024-08-17 NOTE — BRIEF OP NOTE
Brief Postoperative Note      Patient: Danielle Kitchen  YOB: 1950  MRN: [id number]    Date of Procedure: 8/17/2024    Pre-Op Diagnosis Codes:      * Ureteral calculi [N20.1], L urosepsis    Post-Op Diagnosis: Same       Procedure(s):  CYSTOSCOPY LEFT  URETERAL STENT INSERTION    Surgeon(s):  Meño Person MD    Assistant:  * No surgical staff found *    Anesthesia: Other    Estimated Blood Loss (mL): Minimal    Complications: Other: dye extrav    Specimens:   ID Type Source Tests Collected by Time Destination   1 : URINE CULTURE LEFT KIDNEY Urine Urine, Cystoscopic CULTURE, URINE Meño Person MD 8/17/2024 1451        Implants:  Implant Name Type Inv. Item Serial No.  Lot No. LRB No. Used Action   STENT URET 6FR L24CM PERCFLX HYDR+ DBL PGTL THRD 2 - SNA  STENT URET 6FR L24CM PERCFLX HYDR+ DBL PGTL THRD 2 NA [facility]- [id number] Left 1 Implanted         Drains: * No LDAs found *    Findings:  Infection Present At Time Of Surgery (PATOS) (choose all levels that have infection present):  - Organ Space infection (below fascia) present as evidenced by purulent fluid  Other Findings: impacted stone, difficult to get wire past. Extrav of dye noted. Finally able to get .25 angled wire up. 30 cc silverio pus extracted. Retrograde confirmed renal pelvis anatomy  Upper coil of stent in renal pelvis    Electronically signed by Meño Person MD on 8/17/2024 at 3:01 PM

## 2024-08-17 NOTE — OP NOTE
[facility]              [address] [place]  [address]                            OPERATIVE REPORT      PATIENT NAME: JUSTINE BURNETTE                : 1950  MED REC NO: [id number]                       ROOM: OR  ACCOUNT NO: [id number]                       ADMIT DATE: 2024  PROVIDER: Meño Person MD    DATE OF SERVICE:      PREOPERATIVE DIAGNOSES:  Obstructing left ureteral stone, urosepsis.    POSTOPERATIVE DIAGNOSES:  Obstructing left ureteral stone, urosepsis.    PROCEDURES PERFORMED:  Cystoscopy, left retrograde pyelogram, left double-J stent placement 6 x 24.    SURGEON:  Meño Person MD    ASSISTANT:  none    ANESTHESIA:  General.    ESTIMATED BLOOD LOSS:  Minimal.    SPECIMENS REMOVED:  Urine for culture, left kidney.    INTRAOPERATIVE FINDINGS:  Completely occluded left upper ureter with difficult to place stent.  Multiple attempts with straight and angled Glidewire were finally successful to get a 6 x 24 stent in place.  There was some extravasation of dye noted during the case.     COMPLICATIONS:  None.    IMPLANTS:  6 x 24 stent.    INDICATIONS:  The patient transferred from an outside hospital with fevers, obstructing stone, concern for infection and urosepsis.  Stones in her upper ureter on the left.  Options for dealing with were discussed, wished to undergo a stent placement today.  Prior to surgery, she spiked another temperature over 101.  She is being brought to the operating room urgently to undergo stent placement.    DESCRIPTION OF PROCEDURE:  After consent was obtained, the patient was taken to the operating room.  After adequate anesthesia was obtained, she was prepped and draped in lithotomy position.  The rigid cystoscope was inserted in the bladder.  There was inflammation in the bladder and some debris, was irrigated out.  The bladder was viewed.  There was a cystocele, no papillary lesions were seen, just inflammation.  I

## 2024-08-17 NOTE — ANESTHESIA PRE PROCEDURE
Patient Active Problem List   Diagnosis Code   • Obstructive pyelonephritis N11.1       Past Medical History:        Diagnosis Date   • Asthma    • Hypertension        Past Surgical History:  No past surgical history on file.    Social History:    Social History     Tobacco Use   • Smoking status: Former     Types: Cigarettes     Passive exposure: Never   • Smokeless tobacco: Never   Substance Use Topics   • Alcohol use: Never                                Counseling given: Not Answered      Vital Signs (Current):   Vitals:    08/17/24 0245 08/17/24 0301 08/17/24 0506 08/17/24 0748   BP:    121/60   Pulse:  78  85   Resp:  24  20   Temp: 99.7 °F (37.6 °C)   99.1 °F (37.3 °C)   TempSrc: Oral   Oral   SpO2:  96%  94%   Weight:   107.1 kg (236 lb 1.8 oz)    Height:                                                  BP Readings from Last 3 Encounters:   08/17/24 121/60   08/16/24 127/89   06/26/24 116/84       NPO Status:                                                                                 BMI:   Wt Readings from Last 3 Encounters:   08/17/24 107.1 kg (236 lb 1.8 oz)   08/16/24 102.1 kg (225 lb)   06/26/24 99.8 kg (220 lb)     Body mass index is 41.84 kg/m².    CBC:   Lab Results   Component Value Date/Time    WBC 10.6 08/17/2024 02:26 AM    RBC 3.67 08/17/2024 02:26 AM    HGB 9.2 08/17/2024 02:26 AM    HCT 29.7 08/17/2024 02:26 AM    MCV 80.9 08/17/2024 02:26 AM    RDW 15.9 08/17/2024 02:26 AM     08/17/2024 02:26 AM       CMP:   Lab Results   Component Value Date/Time     08/17/2024 02:26 AM    K 3.8 08/17/2024 02:26 AM     08/17/2024 02:26 AM    CO2 25 08/17/2024 02:26 AM    BUN 16 08/17/2024 02:26 AM    CREATININE 1.11 08/17/2024 02:26 AM    LABGLOM 52 08/17/2024 02:26 AM    GLUCOSE 124 08/17/2024 02:26 AM    CALCIUM 8.5 08/17/2024 02:26 AM    BILITOT 0.5 08/16/2024 04:03 PM    ALKPHOS 72 08/16/2024 04:03 PM    AST 7 08/16/2024 04:03 PM    ALT 9 08/16/2024 04:03 PM       POC Tests:

## 2024-08-17 NOTE — CONSULTS
[facility]              [address] [place] [address]                              CONSULTATION      PATIENT NAME: JUSTINE BURNETTE                : 1950  MED REC NO: [id number]                       ROOM: OR  ACCOUNT NO: [id number]                       ADMIT DATE: 2024  PROVIDER: Meño Person MD    DATE OF SERVICE:      ATTENDING PHYSICIAN:  SIDDHARTH VALDEZ    REASON FOR CONSULTATION:  The patient is a 73-year-old female, who had several days of feeling poorly and temperatures at home.  She presented to an outside Emergency Room and was transferred to [facility] for concern for UTI and urosepsis.  Her workup showed a normal white count, urine with lots of inflammation, however, epithelial cells.  Creatinine slightly elevated at 1.1.  She has a 4 mm left upper ureteral stone with stranding around the kidney.  I reviewed the CT images.  The kidney has a delayed nephrogram.  She has never had stones before.  She continues to feel poorly and have left-sided pain.  She just feels weak and tired overall.  I am asked to see her for the above.    Since being transferred, her temperature was 100.4, temperature at present is 99.  She is hemodynamically stable with a pulse of 85.    PAST MEDICAL HISTORY:  Asthma, hypertension.    PAST SURGICAL HISTORY:  None.    SOCIAL HISTORY:  She is a former smoker.    ALLERGIES:  NO KNOWN DRUG ALLERGIES.      PHYSICAL EXAMINATION:  HEENT:  Unremarkable.  No labored breathing.  ABDOMEN:  Soft, tender on the left.  No peritoneal signs.    ASSESSMENT AND PLAN:  Left-sided stones in the proximal ureter.  Offered a trial to pass.  Continued antibiotic therapy versus cystoscopy and stent placement today.  She preferred the latter.  She feels poorly.  We will go ahead and contact the operating room, try to make arrangements for stent placement today.  Keep her n.p.o. for now.        MD MITCHELL JOSUE/AQS  D:

## 2024-08-17 NOTE — PLAN OF CARE
Problem: Discharge Planning  Goal: Discharge to home or other facility with appropriate resources  Outcome: Progressing     Problem: Respiratory - Adult  Goal: Achieves optimal ventilation and oxygenation  Outcome: Progressing     Problem: ABCDS Injury Assessment  Goal: Absence of physical injury  Outcome: Progressing

## 2024-08-17 NOTE — PLAN OF CARE
Problem: Discharge Planning  Goal: Discharge to home or other facility with appropriate resources  8/17/2024 0816 by Braxton eBverly RN  Outcome: Progressing  8/17/2024 0345 by Leoncio Washington RN  Outcome: Progressing     Problem: Respiratory - Adult  Goal: Achieves optimal ventilation and oxygenation  8/17/2024 0728 by Skip Phoenix, RT  Outcome: Progressing  8/17/2024 0345 by Leoncio Washington RN  Outcome: Progressing     Problem: ABCDS Injury Assessment  Goal: Absence of physical injury  8/17/2024 0816 by Braxton Beverly RN  Outcome: Progressing  8/17/2024 0345 by Leoncio Washington RN  Outcome: Progressing     Problem: Safety - Adult  Goal: Free from fall injury  Outcome: Progressing

## 2024-08-17 NOTE — H&P
Range    Procalcitonin 0.10 (H) 0 ng/mL   Troponin    Collection Time: 08/16/24  4:03 PM   Result Value Ref Range    Troponin, High Sensitivity 5 0 - 51 ng/L   POC CHEMISTRY (NA,K,ICA,GLU,CALC HCT/HGB,LACTATE,CREA,CL)    Collection Time: 08/16/24  4:15 PM   Result Value Ref Range    POC Glucose 117 (H) 65 - 100 mg/dL    eGFR, POC Not calculated >60 ml/min/1.73m2    POC Lactic Acid 1.50 0.40 - 2.00 mmol/L    Source Venous      Performed by: Ashley Pelayo    Urinalysis with Reflex to Culture    Collection Time: 08/16/24  6:30 PM    Specimen: Urine   Result Value Ref Range    Color, UA Yellow/Straw      Appearance Clear Clear      Specific Gravity, UA >1.030 (H) 1.003 - 1.030    pH, Urine 5.0 5.0 - 8.0      Protein, UA Negative Negative mg/dL    Glucose, Ur Negative Negative mg/dL    Ketones, Urine Negative Negative mg/dL    Bilirubin, Urine Negative Negative      Blood, Urine Negative Negative      Urobilinogen, Urine 2.0 (H) 0.1 - 1.0 EU/dL    Nitrite, Urine Negative Negative      Leukocyte Esterase, Urine Trace (A) Negative      WBC, UA 10-20 0 - 4 /hpf    RBC, UA 0-5 0 - 5 /hpf    Epithelial Cells, UA Many (A) Few /lpf    BACTERIA, URINE Negative Negative /hpf    Urine Culture if Indicated Urine Culture Ordered (A) Culture not indicated by UA result           CT Result (most recent):  CT ABDOMEN PELVIS W IV CONTRAST 08/16/2024    Narrative  EXAM: CT ABDOMEN PELVIS W IV CONTRAST      CLINICAL HISTORY: left fllank pain, fever  INDICATION: left fllank pain, fever  COMPARISON: None.  CONTRAST: 100  ml Isovue 370  TECHNIQUE:  Multislice helical CT was performed of the abdomen and pelvis following  uneventful rapid bolus intravenous contrast administration.  Oral contrast was  not administered. Contiguous 5 mm axial images were reconstructed and lung and  soft tissue windows were generated. Coronal and sagittal reformations were  generated.  CT dose reduction was achieved through use of a standardized  protocol

## 2024-08-18 VITALS
HEART RATE: 56 BPM | HEIGHT: 63 IN | WEIGHT: 232.81 LBS | RESPIRATION RATE: 18 BRPM | BODY MASS INDEX: 41.25 KG/M2 | OXYGEN SATURATION: 93 % | SYSTOLIC BLOOD PRESSURE: 114 MMHG | TEMPERATURE: 98.3 F | DIASTOLIC BLOOD PRESSURE: 58 MMHG

## 2024-08-18 LAB
ANION GAP SERPL CALC-SCNC: 7 MMOL/L (ref 5–15)
BACTERIA SPEC CULT: NORMAL
BASOPHILS # BLD: 0 K/UL (ref 0–0.1)
BASOPHILS NFR BLD: 0 % (ref 0–1)
BUN SERPL-MCNC: 22 MG/DL (ref 6–20)
BUN/CREAT SERPL: 19 (ref 12–20)
CALCIUM SERPL-MCNC: 8.6 MG/DL (ref 8.5–10.1)
CHLORIDE SERPL-SCNC: 109 MMOL/L (ref 97–108)
CO2 SERPL-SCNC: 25 MMOL/L (ref 21–32)
COLONY COUNT, CNT: NORMAL
COLONY COUNT, CNT: NORMAL
CREAT SERPL-MCNC: 1.18 MG/DL (ref 0.55–1.02)
DIFFERENTIAL METHOD BLD: ABNORMAL
EOSINOPHIL # BLD: 0 K/UL (ref 0–0.4)
EOSINOPHIL NFR BLD: 0 % (ref 0–7)
ERYTHROCYTE [DISTWIDTH] IN BLOOD BY AUTOMATED COUNT: 15.9 % (ref 11.5–14.5)
GLUCOSE SERPL-MCNC: 274 MG/DL (ref 65–100)
HCT VFR BLD AUTO: 31.1 % (ref 35–47)
HGB BLD-MCNC: 9.5 G/DL (ref 11.5–16)
IMM GRANULOCYTES # BLD AUTO: 0 K/UL (ref 0–0.04)
IMM GRANULOCYTES NFR BLD AUTO: 1 % (ref 0–0.5)
LYMPHOCYTES # BLD: 0.5 K/UL (ref 0.8–3.5)
LYMPHOCYTES NFR BLD: 7 % (ref 12–49)
Lab: NORMAL
MCH RBC QN AUTO: 25.3 PG (ref 26–34)
MCHC RBC AUTO-ENTMCNC: 30.5 G/DL (ref 30–36.5)
MCV RBC AUTO: 82.7 FL (ref 80–99)
MONOCYTES # BLD: 0.2 K/UL (ref 0–1)
MONOCYTES NFR BLD: 3 % (ref 5–13)
NEUTS SEG # BLD: 6.3 K/UL (ref 1.8–8)
NEUTS SEG NFR BLD: 89 % (ref 32–75)
NRBC # BLD: 0 K/UL (ref 0–0.01)
NRBC BLD-RTO: 0 PER 100 WBC
PLATELET # BLD AUTO: 176 K/UL (ref 150–400)
PMV BLD AUTO: 10 FL (ref 8.9–12.9)
POTASSIUM SERPL-SCNC: 4.1 MMOL/L (ref 3.5–5.1)
RBC # BLD AUTO: 3.76 M/UL (ref 3.8–5.2)
SODIUM SERPL-SCNC: 141 MMOL/L (ref 136–145)
WBC # BLD AUTO: 7.1 K/UL (ref 3.6–11)

## 2024-08-18 PROCEDURE — 2700000000 HC OXYGEN THERAPY PER DAY

## 2024-08-18 PROCEDURE — 94640 AIRWAY INHALATION TREATMENT: CPT

## 2024-08-18 PROCEDURE — 6370000000 HC RX 637 (ALT 250 FOR IP): Performed by: UROLOGY

## 2024-08-18 PROCEDURE — 2580000003 HC RX 258: Performed by: UROLOGY

## 2024-08-18 PROCEDURE — 36415 COLL VENOUS BLD VENIPUNCTURE: CPT

## 2024-08-18 PROCEDURE — 6360000002 HC RX W HCPCS: Performed by: UROLOGY

## 2024-08-18 PROCEDURE — 85025 COMPLETE CBC W/AUTO DIFF WBC: CPT

## 2024-08-18 PROCEDURE — 80048 BASIC METABOLIC PNL TOTAL CA: CPT

## 2024-08-18 PROCEDURE — 97530 THERAPEUTIC ACTIVITIES: CPT

## 2024-08-18 PROCEDURE — 97161 PT EVAL LOW COMPLEX 20 MIN: CPT

## 2024-08-18 RX ORDER — CIPROFLOXACIN 500 MG/1
500 TABLET, FILM COATED ORAL 2 TIMES DAILY
Qty: 14 TABLET | Refills: 0 | Status: SHIPPED | OUTPATIENT
Start: 2024-08-18 | End: 2024-08-25

## 2024-08-18 RX ORDER — TAMSULOSIN HYDROCHLORIDE 0.4 MG/1
0.4 CAPSULE ORAL DAILY
Qty: 30 CAPSULE | Refills: 0 | Status: SHIPPED | OUTPATIENT
Start: 2024-08-19

## 2024-08-18 RX ADMIN — CEFTRIAXONE 1000 MG: 1 INJECTION, POWDER, FOR SOLUTION INTRAMUSCULAR; INTRAVENOUS at 01:48

## 2024-08-18 RX ADMIN — ARFORMOTEROL TARTRATE: 15 SOLUTION RESPIRATORY (INHALATION) at 07:29

## 2024-08-18 RX ADMIN — ASPIRIN 81 MG: 81 TABLET, COATED ORAL at 09:39

## 2024-08-18 RX ADMIN — TAMSULOSIN HYDROCHLORIDE 0.4 MG: 0.4 CAPSULE ORAL at 09:39

## 2024-08-18 NOTE — DISCHARGE INSTRUCTIONS
All of your medications from before your hospitalization are the same EXCEPT:    Your new prescription for you to start is cipro for UTI-you had mixed urogenital rose on your sample from the emergency department, your sample from this hospital is still pending, I expect cipro to cover the bacteria but you will get a call if it does not.  Please take all of your medications as prescribed. If prescribed any medications, please read all pharmacy instructions and inserts. Inform your doctor and pharmacist about all other medications and alternative therapies.  Please follow up with your PCP in 1-2 weeks to be reassessed after your hospital stay.  Please also follow up with urology.  If you start feeling any symptoms similar to what brought you into the hospital, please come back to the ED to be re-evaluated.

## 2024-08-18 NOTE — PROGRESS NOTES
Urology Progress Note    Patient: Danielle Kitchen MRN: [id number]  SSN: [id number]    YOB: 1950  Age: 73 y.o.  Sex: female            Assessment:     Danielle Kitchen is a 73 y.o. female with a history of L stone , sepsis s/p L stent on yesterday.    Labs ok    Cx pending    Plan:     DC home on cx specific abx when ok with medicine      Subjective:     Feels much better today    Objective:     BP (!) 114/58   Pulse 56   Temp 98.1 °F (36.7 °C) (Oral)   Resp 18   Ht 1.6 m (5' 3\")   Wt 105.6 kg (232 lb 12.9 oz)   SpO2 93%   BMI 41.24 kg/m²       Intake/Output Summary (Last 24 hours) at 8/18/2024 0927  Last data filed at 8/17/2024 1812  Gross per 24 hour   Intake 2398.83 ml   Output 550 ml   Net 1848.83 ml       Physical Exam  General: NAD  HEENT: NC/AT, EOMI, MMM  Abdomen:  Neuro: Appropriate, no focal neurological deficits  Mood/Affect: normal    [unfilled]    Imaging:  CT ABDOMEN PELVIS W IV CONTRAST Additional Contrast? None    Result Date: 8/16/2024  Left-sided mild to moderate hydroureteronephrosis with 4 mm calculus in the proximal left ureter. Incidental and/or nonemergent findings are as described above. Electronically signed by AMINA MELENDEZ    XR CHEST PORTABLE    Result Date: 8/16/2024  1. Lungs are well aerated and clear. Electronically signed by AMADEO QIU       Signed By: Meño Person MD - August 18, 2024  
Dictated    Fever  Left stone  Still feels poorly  Plan L stent    
Doing okay, not better, not worse.  Was seen prior to urology, may need stent.  Afebrile, VSS.  Agree with overnight H&P.  Nonbillable note.  
End of Shift Note    Bedside shift change report given to BENIGNO Bender (oncoming nurse) by Sue Eric LPN (offgoing nurse).  Report included the following information SBAR, Kardex, Intake/Output, MAR, Accordion, Recent Results, Med Rec Status, and Cardiac Rhythm nsr    Shift worked:  4566-4623     Shift summary and any significant changes:    Labs collected. No complaints of pain during shift. 1 black stone collected overnight in sterile container when straining urine. No complaints at the time of shift change. Plan of care ongoing.   Concerns for physician to address:  N/a     [facility] phone for oncoming shift:   [phone]       Activity:     Number times ambulated in hallways past shift: 0  Number of times OOB to chair past shift: 2    Cardiac:   Cardiac Monitoring: Yes           Access:  Current line(s): PIV     Genitourinary:   Urinary status: voiding    Respiratory:      Chronic home O2 use?: NO  Incentive spirometer at bedside: N/A       GI:     Current diet:  ADULT DIET; Regular  Passing flatus: YES  Tolerating current diet: YES       Pain Management:   Patient states pain is manageable on current regimen: YES    Skin:     Interventions: float heels, increase time out of bed, and limit briefs    Patient Safety:  Fall Score:    Interventions: bed/chair alarm, pt to call before getting OOB, and stay with me (per policy)       Length of Stay:  Expected LOS: 2  Actual LOS: 0      Sue Eric LPN                           
End of Shift Note    Bedside shift change report given to BENIGNO Valle (oncoming nurse) by Sue Eric LPN (offgoing nurse).  Report included the following information SBAR, Kardex, Intake/Output, MAR, Accordion, Recent Results, Med Rec Status, and Cardiac Rhythm nsr    Shift worked:  2572-9226     Shift summary and any significant changes:     Labs collected. No complaints of pain during shift. Patient had 1 urine occurrence during shift with no stones present when strained. No complaints at the time of shift change. Plan of care ongoing.   Concerns for physician to address:  N/a     [facility] phone for oncoming shift:   [phone]       Activity:     Number times ambulated in hallways past shift: 0  Number of times OOB to chair past shift: 2    Cardiac:   Cardiac Monitoring: Yes           Access:  Current line(s): PIV     Genitourinary:   Urinary status: voiding    Respiratory:      Chronic home O2 use?: NO  Incentive spirometer at bedside: N/A       GI:     Current diet:  Diet NPO Exceptions are: Ice Chips, Sips of Water with Meds  Passing flatus: YES  Tolerating current diet: YES       Pain Management:   Patient states pain is manageable on current regimen: YES    Skin:     Interventions: float heels, increase time out of bed, and limit briefs    Patient Safety:  Fall Score:    Interventions: bed/chair alarm, pt to call before getting OOB, and stay with me (per policy)       Length of Stay:  Expected LOS: 2  Actual LOS: 0      Sue Eric LPN                           
End of Shift Note    Bedside shift change report given to GERMAINE Rogers (oncoming nurse) by Braxton Beverly RN (offgoing nurse).  Report included the following information SBAR REPORTS LIST: SBAR, Kardex, ED Summary, OR Summary, Procedure Summary, Intake/Output, MAR, Accordion, Recent Results, Med Rec Status, Cardiac Rhythm NSR, Alarm Parameters , and Quality Measures    Shift worked:  7a-7p     Shift summary and any significant changes:    Patient tolerated nursing care without distress. Urinary output measured and strained for stones; none identified/discovered during shift. Patient vitals WNL POST-procedure. Voided adequate urinary output without difficulty POST-procedure. No complaint by patient at shift change.      Concerns for physician to address:  None at this time     [facility] phone for oncoming shift:   [phone]       Activity:  Activity: Out of bed with assistance  Number times ambulated in hallways past shift: 0  Number of times OOB to chair past shift: 1    Cardiac:   Cardiac Monitoring: YES / NO: Yes        Access:  Current line(s): IV ACCESS: - PIV    Genitourinary:   Urinary status: Urinary status: Patient is voiding without difficulty.    Respiratory:   oxygen delivery: room air  Chronic home O2 use?: YES / NO: No  Incentive spirometer at bedside: YES / NO: Yes and patient encouraged to use 10 times per hour while awake.      GI:  Current diet:  DIET: regular  Passing flatus: YES / NO: Yes  Tolerating current diet: YES / NO: Yes      Pain Management:   Patient states pain is manageable on current regimen: YES / NO: Yes    Skin:    Interventions: JI SCALE: 21    Patient Safety:  Fall Score: FALL RISK ASSESSMENT: At risk due to:  occasional unsteady gait due to weakness/fatigue.  Interventions: Fall Interventions Provided: Implemented/recommended use of non-skid footwear, Implemented/recommended use of fall risk identification flag to all team members, Implemented/recommended assistive devices and 
Order received and acknowledged.  Spoke to nurse who indicated pt was SBA in room for mobility.  OT concurred.  Pt is presently in OR for Cystoscopy.  Will defer and follow up tomorrow as able.  Thanks.  
[email or web address]. PMID: [id number]; PMCID: [id number].  4. Ramona UP, Diana S, Nirmal W, Zamzam P. AM-PAC Short Forms Manual 4.0. Revised 2/2020.                                                                                                                                                                                                                              Pain Rating:  Denies pain    Activity Tolerance:   Good, requires rest breaks, and observed shortness of breath on exertion but unable to obtain portable ox reading    After treatment:   Pt left up in room, all needs met, clear for upcoming DC.      COMMUNICATION/EDUCATION:   The patient's plan of care was discussed with: registered nurse and physician    Patient Education  Education Given To: Patient  Education Provided: Role of Therapy;Plan of Care;Transfer Training;Fall Prevention Strategies  Education Provided Comments: OP PT at DC  Education Method: Verbal  Barriers to Learning: None  Education Outcome: Verbalized understanding    Thank you for this referral.  Aspen Temple, PT, DPT, NCS  Minutes: 12      Physical Therapy Evaluation Charge Determination   History Examination Presentation Decision-Making   MEDIUM  Complexity : 1-2 comorbidities / personal factors will impact the outcome/ POC  LOW Complexity : 1-2 Standardized tests and measures addressing body structure, function, activity limitation and / or participation in recreation  LOW Complexity : Stable, uncomplicated  AM-PAC  LOW      Based on the above components, the patient evaluation is determined to be of the following complexity level: Low   
Assessment of post-stroke quality of life in cost-effectiveness studies: The usefulness of the Barthel Index and the EuroQoL-5D. Quality of Life Research, 13, 427-78                                                                                                                                                                                                                                 Pain Ratin/10  no complaints this session   Pain Intervention(s):       Activity Tolerance:   Fair , requires rest breaks, and pt reports feeling very tired    After treatment:   Patient left in no apparent distress in bed, Call bell within reach, Side rails x3, and nurse informed.      COMMUNICATION/EDUCATION:   The patient's plan of care was discussed with: physical therapist and registered nurse    Patient Education  Education Given To: Patient  Education Provided: Role of Therapy;Plan of Care;Fall Prevention Strategies  Education Method: Verbal  Barriers to Learning: None  Education Outcome: Verbalized understanding    Thank you for this referral.  Cass Ash OTR/L  Minutes: 19    Occupational Therapy Evaluation Charge Determination   History Examination Decision-Making   MEDIUM Complexity : Expanded review of history including physical, cognitive and psychocial history  MEDIUM Complexity: 3-5 Performance deficits relating to physical, cognitive, or psychosocial skills that result in activity limitations and/or participation restrictions LOW Complexity: No comorbidities that affect functional and  no verbal  or physical assist needed to complete eval tasks   Based on the above components, the patient evaluation is determined to be of the following complexity level: Medium

## 2024-08-18 NOTE — PLAN OF CARE
Problem: Discharge Planning  Goal: Discharge to home or other facility with appropriate resources  Outcome: Progressing     Problem: Respiratory - Adult  Goal: Achieves optimal ventilation and oxygenation  Outcome: Progressing     Problem: ABCDS Injury Assessment  Goal: Absence of physical injury  Outcome: Progressing     Problem: Safety - Adult  Goal: Free from fall injury  Outcome: Progressing     Problem: Skin/Tissue Integrity  Goal: Absence of new skin breakdown  Description: 1.  Monitor for areas of redness and/or skin breakdown  2.  Assess vascular access sites hourly  3.  Every 4-6 hours minimum:  Change oxygen saturation probe site  4.  Every 4-6 hours:  If on nasal continuous positive airway pressure, respiratory therapy assess nares and determine need for appliance change or resting period.  Outcome: Progressing

## 2024-08-18 NOTE — PLAN OF CARE
Problem: Discharge Planning  Goal: Discharge to home or other facility with appropriate resources  8/18/2024 1313 by Yulia Jang RN  Outcome: Completed  8/18/2024 1021 by Yulia Jang RN  Outcome: Progressing     Problem: Respiratory - Adult  Goal: Achieves optimal ventilation and oxygenation  8/18/2024 1313 by Yulia Jang RN  Outcome: Completed  8/18/2024 1021 by Yulia Jang RN  Outcome: Progressing     Problem: ABCDS Injury Assessment  Goal: Absence of physical injury  8/18/2024 1313 by Yulia Jang RN  Outcome: Completed  8/18/2024 1021 by Yulia Jang RN  Outcome: Progressing     Problem: Safety - Adult  Goal: Free from fall injury  8/18/2024 1313 by Yulia Jang RN  Outcome: Completed  8/18/2024 1021 by Yulia Jang RN  Outcome: Progressing     Problem: Skin/Tissue Integrity  Goal: Absence of new skin breakdown  Description: 1.  Monitor for areas of redness and/or skin breakdown  2.  Assess vascular access sites hourly  3.  Every 4-6 hours minimum:  Change oxygen saturation probe site  4.  Every 4-6 hours:  If on nasal continuous positive airway pressure, respiratory therapy assess nares and determine need for appliance change or resting period.  8/18/2024 1313 by Yulia Jang RN  Outcome: Completed  8/18/2024 1021 by Yulia Jang RN  Outcome: Progressing

## 2024-08-18 NOTE — DISCHARGE SUMMARY
management.    Luke Owens MD  [address]  [facility] [address]  [phone]    Schedule an appointment as soon as possible for a visit in 1 week(s)  1-2 weeks for hospital follow up for kidney stones and UTI.          Total time in minutes spent coordinating this discharge (includes going over instructions, follow-up, prescriptions, and preparing report for sign off to her PCP) :  35 minutes

## 2024-08-19 LAB
BACTERIA SPEC CULT: ABNORMAL
CC UR VC: ABNORMAL
SERVICE CMNT-IMP: ABNORMAL

## 2024-08-22 LAB
BACTERIA SPEC CULT: NORMAL
BACTERIA SPEC CULT: NORMAL
Lab: NORMAL
Lab: NORMAL

## (undated) DEVICE — MANIFOLD REPROC SUCT 4 PRT F/NEPTUNE 2 WST MGMT SYS

## (undated) DEVICE — OPEN-END URETERAL CATHETER: Brand: COOK

## (undated) DEVICE — CANISTER, RIGID, 3000CC: Brand: MEDLINE INDUSTRIES, INC.

## (undated) DEVICE — GLOVE ORANGE PI 7 1/2   MSG9075

## (undated) DEVICE — SOLUTION IRRIGATION STRL H2O 1000 ML UROMATIC CONTAINER

## (undated) DEVICE — GUIDEWIRE ENDOSCP L150CM DIA0.035IN TIP 3CM PTFE NIT

## (undated) DEVICE — GUIDEWIRE ENDOSCP L150CM DIA0.035IN TIP L15CM BENT PTFE

## (undated) DEVICE — GOWN,SIRUS,FABRNF,XL,20/CS: Brand: MEDLINE

## (undated) DEVICE — SOLUTION IRRIG 1000ML STRL H2O USP PLAS POUR BTL

## (undated) DEVICE — GUIDEWIRE UROLOGICAL STR STD 0.035 IN X150 CM (QTY = BOX OF 5)

## (undated) DEVICE — CYSTO MRMC: Brand: MEDLINE INDUSTRIES, INC.

## (undated) DEVICE — ZIPWIRE

## (undated) DEVICE — SYRINGE 20ML LL S/C 50

## (undated) DEVICE — SOLUTION IRRIG 3000ML 0.9% SOD CHL USP UROMATIC PLAS CONT